# Patient Record
Sex: MALE | Race: WHITE | NOT HISPANIC OR LATINO | Employment: OTHER | URBAN - METROPOLITAN AREA
[De-identification: names, ages, dates, MRNs, and addresses within clinical notes are randomized per-mention and may not be internally consistent; named-entity substitution may affect disease eponyms.]

---

## 2021-03-03 DIAGNOSIS — Z23 NEED FOR VACCINATION: ICD-10-CM

## 2023-09-03 ENCOUNTER — OFFICE VISIT (OUTPATIENT)
Dept: URGENT CARE | Facility: PHYSICIAN GROUP | Age: 68
End: 2023-09-03

## 2023-09-03 VITALS
DIASTOLIC BLOOD PRESSURE: 65 MMHG | SYSTOLIC BLOOD PRESSURE: 135 MMHG | OXYGEN SATURATION: 97 % | HEART RATE: 54 BPM | HEIGHT: 69 IN | RESPIRATION RATE: 20 BRPM | TEMPERATURE: 97.8 F | WEIGHT: 168.8 LBS | BODY MASS INDEX: 25 KG/M2

## 2023-09-03 DIAGNOSIS — M54.42 ACUTE LEFT-SIDED LOW BACK PAIN WITH LEFT-SIDED SCIATICA: ICD-10-CM

## 2023-09-03 DIAGNOSIS — M25.552 LEFT HIP PAIN: ICD-10-CM

## 2023-09-03 PROCEDURE — 3078F DIAST BP <80 MM HG: CPT | Performed by: STUDENT IN AN ORGANIZED HEALTH CARE EDUCATION/TRAINING PROGRAM

## 2023-09-03 PROCEDURE — 3075F SYST BP GE 130 - 139MM HG: CPT | Performed by: STUDENT IN AN ORGANIZED HEALTH CARE EDUCATION/TRAINING PROGRAM

## 2023-09-03 PROCEDURE — 99203 OFFICE O/P NEW LOW 30 MIN: CPT | Performed by: STUDENT IN AN ORGANIZED HEALTH CARE EDUCATION/TRAINING PROGRAM

## 2023-09-03 RX ORDER — METHYLPREDNISOLONE 4 MG/1
TABLET ORAL
Qty: 21 TABLET | Refills: 0 | Status: SHIPPED | OUTPATIENT
Start: 2023-09-03

## 2023-09-03 ASSESSMENT — ENCOUNTER SYMPTOMS
NAUSEA: 0
WEAKNESS: 0
CONSTIPATION: 0
TINGLING: 0
HIP PAIN: 1
FEVER: 0
VOMITING: 0
DIARRHEA: 0
ABDOMINAL PAIN: 0
NUMBNESS: 0
BACK PAIN: 1
MYALGIAS: 0
NECK PAIN: 0
FATIGUE: 0
CHILLS: 0

## 2023-09-03 NOTE — PROGRESS NOTES
"Subjective     Lyle Hall is a 68 y.o. male who presents with Leg Pain (pain in left leg around the thigh area running down to the knee,x1 day)            Lyle is a 5 y.o. male who presents with left hip/leg pain that started yesterday when he woke up. No injury/trauma. Patient ports taking OTC ibuprofen yesterday which helped.  Patient states the pain improved throughout the day.  When patient woke up again this morning he noticed that having left hip/leg pain again.   Pain is less than yesterday.  Pain originates in left-side low back region and radiates into left hip and back of leg.  No numbness, perianal numbness, tingling or weakness.  No abdominal pain, bladder incontinence, bowel incontinence. No swelling, redness or bruising.    Hip Pain  This is a new problem. The current episode started yesterday. The problem has been unchanged. Pertinent negatives include no abdominal pain, chills, fatigue, fever, myalgias, nausea, neck pain, numbness, urinary symptoms, vomiting or weakness.       Review of Systems   Constitutional:  Negative for chills, fatigue, fever and malaise/fatigue.   Gastrointestinal:  Negative for abdominal pain, constipation, diarrhea, nausea and vomiting.   Genitourinary:  Negative for dysuria.   Musculoskeletal:  Positive for back pain and joint pain. Negative for myalgias and neck pain.   Neurological:  Negative for tingling, weakness and numbness.   All other systems reviewed and are negative.             Objective     BP (!) 140/58 (BP Location: Left arm, Patient Position: Sitting, BP Cuff Size: Adult)   Pulse (!) 54   Temp 36.6 °C (97.8 °F) (Temporal)   Resp 20   Ht 1.753 m (5' 9\")   Wt 76.6 kg (168 lb 12.8 oz)   SpO2 97%   BMI 24.93 kg/m²      Physical Exam  Vitals reviewed.   Constitutional:       Appearance: Normal appearance.   HENT:      Head: Normocephalic and atraumatic.   Eyes:      Extraocular Movements: Extraocular movements intact.      Conjunctiva/sclera: " Conjunctivae normal.      Pupils: Pupils are equal, round, and reactive to light.   Cardiovascular:      Rate and Rhythm: Normal rate.   Pulmonary:      Effort: Pulmonary effort is normal.   Musculoskeletal:      Cervical back: Normal.      Thoracic back: Normal.      Lumbar back: Tenderness present. No swelling, deformity or bony tenderness. Decreased range of motion (flexion).        Back:       Left hip: Tenderness present. Normal range of motion. Normal strength.      Right upper leg: Normal.      Left upper leg: No swelling, deformity, tenderness or bony tenderness.      Right knee: No swelling or bony tenderness. Normal range of motion.      Left knee: Normal. No swelling or bony tenderness. Normal range of motion.      Right lower le+ Pitting Edema present.      Left lower le+ Pitting Edema present.        Legs:    Neurological:      General: No focal deficit present.      Mental Status: He is alert. Mental status is at baseline.                             Assessment & Plan        1. Acute left-sided low back pain with left-sided sciatica  - methylPREDNISolone (MEDROL DOSEPAK) 4 MG Tablet Therapy Pack; Follow schedule on package instructions.  Dispense: 21 Tablet; Refill: 0    2. Left hip pain  - Patient self-pay. Declined imaging.  - methylPREDNISolone (MEDROL DOSEPAK) 4 MG Tablet Therapy Pack; Follow schedule on package instructions.  Dispense: 21 Tablet; Refill: 0     Differential diagnoses, supportive care measures (rest, heat, OTC Tylenol) and indications for immediate follow-up discussed with patient. Pathogenesis of diagnosis discussed including typical length and natural progression.  Er precautions discussed.    Instructed to return to urgent care or nearest emergency department if symptoms fail to improve, for any change in condition, further concerns, or new concerning symptoms.    Patient states understanding and agrees with the plan of care and discharge instructions.